# Patient Record
Sex: MALE | Race: WHITE | NOT HISPANIC OR LATINO | Employment: UNEMPLOYED | URBAN - METROPOLITAN AREA
[De-identification: names, ages, dates, MRNs, and addresses within clinical notes are randomized per-mention and may not be internally consistent; named-entity substitution may affect disease eponyms.]

---

## 2020-09-17 ENCOUNTER — HOSPITAL ENCOUNTER (EMERGENCY)
Facility: HOSPITAL | Age: 30
End: 2020-09-17
Attending: EMERGENCY MEDICINE | Admitting: EMERGENCY MEDICINE
Payer: COMMERCIAL

## 2020-09-17 VITALS
OXYGEN SATURATION: 97 % | HEART RATE: 102 BPM | WEIGHT: 180 LBS | BODY MASS INDEX: 28.93 KG/M2 | RESPIRATION RATE: 18 BRPM | SYSTOLIC BLOOD PRESSURE: 134 MMHG | DIASTOLIC BLOOD PRESSURE: 73 MMHG | TEMPERATURE: 98.8 F | HEIGHT: 66 IN

## 2020-09-17 DIAGNOSIS — K04.7 DENTAL ABSCESS: Primary | ICD-10-CM

## 2020-09-17 PROCEDURE — 96372 THER/PROPH/DIAG INJ SC/IM: CPT

## 2020-09-17 PROCEDURE — 99282 EMERGENCY DEPT VISIT SF MDM: CPT | Performed by: EMERGENCY MEDICINE

## 2020-09-17 PROCEDURE — 99282 EMERGENCY DEPT VISIT SF MDM: CPT

## 2020-09-17 RX ORDER — KETOROLAC TROMETHAMINE 30 MG/ML
15 INJECTION, SOLUTION INTRAMUSCULAR; INTRAVENOUS ONCE
Status: COMPLETED | OUTPATIENT
Start: 2020-09-17 | End: 2020-09-17

## 2020-09-17 RX ORDER — VENLAFAXINE HYDROCHLORIDE 150 MG/1
150 CAPSULE, EXTENDED RELEASE ORAL DAILY
COMMUNITY

## 2020-09-17 RX ORDER — AMOXICILLIN 500 MG/1
500 CAPSULE ORAL EVERY 12 HOURS SCHEDULED
Qty: 20 CAPSULE | Refills: 0 | Status: SHIPPED | OUTPATIENT
Start: 2020-09-17 | End: 2020-09-27

## 2020-09-17 RX ORDER — AMOXICILLIN 250 MG/1
500 CAPSULE ORAL ONCE
Status: COMPLETED | OUTPATIENT
Start: 2020-09-17 | End: 2020-09-17

## 2020-09-17 RX ADMIN — AMOXICILLIN 500 MG: 250 CAPSULE ORAL at 14:33

## 2020-09-17 RX ADMIN — KETOROLAC TROMETHAMINE 15 MG: 30 INJECTION, SOLUTION INTRAMUSCULAR at 14:33

## 2020-09-17 NOTE — ED PROVIDER NOTES
History  Chief Complaint   Patient presents with    Dental Pain     Patient states that he started with right upper dental pain three days ago and started with right facial swelling yesterday       26-year-old male presents for evaluation of right upper dental pain that started 3 days ago  Patient reports taking Motrin yesterday with mild relief of symptoms  Patient is able to tolerate oral intake  Denies associated shortness of breath or fevers, chills, change in vision  Prior to Admission Medications   Prescriptions Last Dose Informant Patient Reported? Taking?   venlafaxine (EFFEXOR-XR) 150 mg 24 hr capsule   Yes Yes   Sig: Take 150 mg by mouth daily      Facility-Administered Medications: None       Past Medical History:   Diagnosis Date    Anxiety     Asthma     Depression        Past Surgical History:   Procedure Laterality Date    APPENDECTOMY         No family history on file  I have reviewed and agree with the history as documented  E-Cigarette/Vaping     E-Cigarette/Vaping Substances     Social History     Tobacco Use    Smoking status: Current Every Day Smoker     Packs/day: 1 00    Smokeless tobacco: Never Used   Substance Use Topics    Alcohol use: Not Currently    Drug use: Not Currently       Review of Systems   HENT: Positive for dental problem  Negative for facial swelling, sore throat and trouble swallowing  Eyes: Negative for pain and visual disturbance  Physical Exam  Physical Exam  HENT:      Mouth/Throat:        Comments:   Poor dentition with multiple caries  Tender to palpation along the right upper lateral gumline without evidence of discrete abscess  No submandibular or sublingual edema  Normal phonation    Posterior oropharynx clear, moist and symmetrical         Vital Signs  ED Triage Vitals [09/17/20 1348]   Temperature Pulse Respirations Blood Pressure SpO2   98 8 °F (37 1 °C) 102 18 134/73 97 %      Temp Source Heart Rate Source Patient Position - Orthostatic VS BP Location FiO2 (%)   Temporal -- Sitting Right arm --      Pain Score       9           Vitals:    09/17/20 1348   BP: 134/73   Pulse: 102   Patient Position - Orthostatic VS: Sitting         Visual Acuity      ED Medications  Medications   amoxicillin (AMOXIL) capsule 500 mg (500 mg Oral Given 9/17/20 1433)   ketorolac (TORADOL) injection 15 mg (15 mg Intramuscular Given 9/17/20 1433)       Diagnostic Studies  Results Reviewed     None                 No orders to display              Procedures  Procedures         ED Course                           SBIRT 22yo+      Most Recent Value   SBIRT (24 yo +)   In order to provide better care to our patients, we are screening all of our patients for alcohol and drug use  Would it be okay to ask you these screening questions? Unable to answer at this time Filed at: 09/17/2020 1351                  MDM  Number of Diagnoses or Management Options  Dental abscess:   Diagnosis management comments:   79-year-old female presenting with dentalgia likely secondary to dental abscess  Will administer antibiotics, pain control as needed  Follow-up with dentist   Return precautions discussed  Disposition  Final diagnoses:   Dental abscess     Time reflects when diagnosis was documented in both MDM as applicable and the Disposition within this note     Time User Action Codes Description Comment    9/17/2020  2:31 PM Bouchra Galvan Add [K04 7] Dental abscess       ED Disposition     ED Disposition Condition Date/Time Comment    Discharge Stable Thu Sep 17, 2020  2:31 PM Ana España discharge to home/self care  Follow-up Information     Follow up With Specialties Details Why Contact Info Additional [address] [facility]   [phone] [address]  [facility] Emergency Medicine  [facility] symptoms worsen [facility] [address]  [phone] [address] ED, [address] [facility], [address]          Discharge Medication List as of 9/17/2020  2:32 PM      START taking these medications    Details   amoxicillin (AMOXIL) 500 mg capsule Take 1 capsule (500 mg total) by mouth every 12 (twelve) hours for 10 days, Starting Thu 9/17/2020, Until Sun 9/27/2020, Print         CONTINUE these medications which have NOT CHANGED    Details   venlafaxine (EFFEXOR-XR) 150 mg 24 hr capsule Take 150 mg by mouth daily, Historical Med           No discharge procedures on file      PDMP Review     None          ED Provider  Electronically Signed by           Emilia Hough DO  09/17/20 0586